# Patient Record
Sex: MALE | Race: BLACK OR AFRICAN AMERICAN | Employment: OTHER | URBAN - METROPOLITAN AREA
[De-identification: names, ages, dates, MRNs, and addresses within clinical notes are randomized per-mention and may not be internally consistent; named-entity substitution may affect disease eponyms.]

---

## 2017-02-08 ENCOUNTER — OFFICE VISIT (OUTPATIENT)
Dept: ENDOCRINOLOGY | Age: 82
End: 2017-02-08

## 2017-02-08 VITALS
HEIGHT: 66 IN | SYSTOLIC BLOOD PRESSURE: 136 MMHG | HEART RATE: 96 BPM | DIASTOLIC BLOOD PRESSURE: 75 MMHG | BODY MASS INDEX: 27.27 KG/M2 | WEIGHT: 169.7 LBS

## 2017-02-08 DIAGNOSIS — E11.9 TYPE 2 DIABETES MELLITUS WITHOUT COMPLICATION, WITHOUT LONG-TERM CURRENT USE OF INSULIN (HCC): Primary | ICD-10-CM

## 2017-02-08 DIAGNOSIS — I10 ESSENTIAL HYPERTENSION WITH GOAL BLOOD PRESSURE LESS THAN 130/80: ICD-10-CM

## 2017-02-08 RX ORDER — METFORMIN HYDROCHLORIDE 500 MG/1
500 TABLET, EXTENDED RELEASE ORAL
Qty: 180 TAB | Refills: 3 | Status: SHIPPED | OUTPATIENT
Start: 2017-02-08 | End: 2017-04-05 | Stop reason: SDUPTHER

## 2017-02-08 RX ORDER — ALBUTEROL SULFATE 90 UG/1
AEROSOL, METERED RESPIRATORY (INHALATION)
COMMUNITY
Start: 2016-12-22

## 2017-02-08 RX ORDER — GLIPIZIDE 5 MG/1
TABLET ORAL 2 TIMES DAILY
COMMUNITY
End: 2017-02-27

## 2017-02-08 RX ORDER — LANCETS
EACH MISCELLANEOUS
Qty: 1 PACKAGE | Refills: 7 | Status: SHIPPED | OUTPATIENT
Start: 2017-02-08

## 2017-02-08 RX ORDER — BLOOD-GLUCOSE METER
KIT MISCELLANEOUS
COMMUNITY
Start: 2017-02-07

## 2017-02-08 NOTE — PATIENT INSTRUCTIONS
Start metformin 500mg twice daily  Hold the glipizide for now, we will restart it later with your lunch    ----------------------------------------------------------------------------------------------------------------------    Below you will find a glucose log sheet which you can use to record your blood sugars. Without checking and recording what your home glucose levels are, it will be difficult to make any changes to your medication dose, even when significant changes may be needed. Please feel free to use the log below to record your home glucose levels. At the very least, I would like for you to login the entire 2-3 weeks just before your visit so we can make your visit much more productive and beneficial to you. GLUCOSE LOG SHEET:    Date Breakfast Lunch Dinner Bedtime Comments ? GLUCOSE LOG SHEET:    Date Breakfast Lunch Dinner Bedtime Comments ? GLUCOSE LOG SHEET:    Date Breakfast Lunch Dinner Bedtime Comments ? Learning About Diabetes Food Guidelines  Your Care Instructions  Meal planning is important to manage diabetes. It helps keep your blood sugar at a target level (which you set with your doctor). You don't have to eat special foods.  You can eat what your family eats, including sweets once in a while. But you do have to pay attention to how often you eat and how much you eat of certain foods. You may want to work with a dietitian or a certified diabetes educator (CDE) to help you plan meals and snacks. A dietitian or CDE can also help you lose weight if that is one of your goals. What should you know about eating carbs? Managing the amount of carbohydrate (carbs) you eat is an important part of healthy meals when you have diabetes. Carbohydrate is found in many foods. · Learn which foods have carbs. And learn the amounts of carbs in different foods. ¨ Bread, cereal, pasta, and rice have about 15 grams of carbs in a serving. A serving is 1 slice of bread (1 ounce), ½ cup of cooked cereal, or 1/3 cup of cooked pasta or rice. ¨ Fruits have 15 grams of carbs in a serving. A serving is 1 small fresh fruit, such as an apple or orange; ½ of a banana; ½ cup of cooked or canned fruit; ½ cup of fruit juice; 1 cup of melon or raspberries; or 2 tablespoons of dried fruit. ¨ Milk and no-sugar-added yogurt have 15 grams of carbs in a serving. A serving is 1 cup of milk or 2/3 cup of no-sugar-added yogurt. ¨ Starchy vegetables have 15 grams of carbs in a serving. A serving is ½ cup of mashed potatoes or sweet potato; 1 cup winter squash; ½ of a small baked potato; ½ cup of cooked beans; or ½ cup cooked corn or green peas. · Learn how much carbs to eat each day and at each meal. A dietitian or CDE can teach you how to keep track of the amount of carbs you eat. This is called carbohydrate counting. · If you are not sure how to count carbohydrate grams, use the Plate Method to plan meals. It is a good, quick way to make sure that you have a balanced meal. It also helps you spread carbs throughout the day. ¨ Divide your plate by types of foods.  Put non-starchy vegetables on half the plate, meat or other protein food on one-quarter of the plate, and a grain or starchy vegetable in the final quarter of the plate. To this you can add a small piece of fruit and 1 cup of milk or yogurt, depending on how many carbs you are supposed to eat at a meal.  · Try to eat about the same amount of carbs at each meal. Do not \"save up\" your daily allowance of carbs to eat at one meal.  · Proteins have very little or no carbs per serving. Examples of proteins are beef, chicken, turkey, fish, eggs, tofu, cheese, cottage cheese, and peanut butter. A serving size of meat is 3 ounces, which is about the size of a deck of cards. Examples of meat substitute serving sizes (equal to 1 ounce of meat) are 1/4 cup of cottage cheese, 1 egg, 1 tablespoon of peanut butter, and ½ cup of tofu. How can you eat out and still eat healthy? · Learn to estimate the serving sizes of foods that have carbohydrate. If you measure food at home, it will be easier to estimate the amount in a serving of restaurant food. · If the meal you order has too much carbohydrate (such as potatoes, corn, or baked beans), ask to have a low-carbohydrate food instead. Ask for a salad or green vegetables. · If you use insulin, check your blood sugar before and after eating out to help you plan how much to eat in the future. · If you eat more carbohydrate at a meal than you had planned, take a walk or do other exercise. This will help lower your blood sugar. What else should you know? · Limit saturated fat, such as the fat from meat and dairy products. This is a healthy choice because people who have diabetes are at higher risk of heart disease. So choose lean cuts of meat and nonfat or low-fat dairy products. Use olive or canola oil instead of butter or shortening when cooking. · Don't skip meals. Your blood sugar may drop too low if you skip meals and take insulin or certain medicines for diabetes. · Check with your doctor before you drink alcohol. Alcohol can cause your blood sugar to drop too low.  Alcohol can also cause a bad reaction if you take certain diabetes medicines. Follow-up care is a key part of your treatment and safety. Be sure to make and go to all appointments, and call your doctor if you are having problems. It's also a good idea to know your test results and keep a list of the medicines you take. Where can you learn more? Go to http://ericka-wilbur.info/. Enter L549 in the search box to learn more about \"Learning About Diabetes Food Guidelines. \"  Current as of: May 23, 2016  Content Version: 11.1  © 9908-2159 [employer]. Care instructions adapted under license by [employer] (which disclaims liability or warranty for this information). If you have questions about a medical condition or this instruction, always ask your healthcare professional. [address] any warranty or liability for your use of this information.

## 2017-02-08 NOTE — MR AVS SNAPSHOT
Visit Information Date & Time Provider Department Dept. Phone Encounter #  
 2/8/2017  8:30 AM Armaan Yates, [address] [place] [address] Diabetes and Endocrinology [phone] [id number] Follow-up Instructions Return in about 3 months (around 5/8/2017). Your Appointments 2/8/2017 10:45 AM  
Any with Yenifer Valencia MD  
[facility] OFFICE-[facility] ([address]) Appt Note: 6m fu; r/s; r/s  
 [address] [facility] [id number] [phone] [facility] [address] [phone] Upcoming Health Maintenance Date Due INFLUENZA AGE 9 TO ADULT 8/1/2016 Pneumococcal 65+ Low/Medium Risk (2 of 2 - PPSV23) 7/13/2017 MEDICARE YEARLY EXAM 7/14/2017 GLAUCOMA SCREENING Q2Y 4/14/2018 DTaP/Tdap/Td series (2 - Td) 7/13/2026 Allergies as of 2/8/2017  Review Complete On: 2/8/2017 By: Armaan Yates MD  
 No Known Allergies Current Immunizations  Never Reviewed Name Date Pneumococcal Conjugate (PCV-13) 7/13/2016 Not reviewed this visit You Were Diagnosed With   
  
 Codes Comments Type 2 diabetes mellitus without complication, without long-term current use of insulin (HCC)    -  Primary ICD-10-CM: E11.9 ICD-9-CM: 250.00 Essential hypertension with goal blood pressure less than 130/80     ICD-10-CM: I10 
ICD-9-CM: 401.9 Vitals BP Pulse Height(growth percentile) Weight(growth percentile) BMI Smoking Status 136/75 (BP 1 Location: Right arm, BP Patient Position: Sitting) 96 5' 6\" (1.676 m) 169 lb 11.2 oz (77 kg) 27.39 kg/m2 Former Smoker Vitals History BMI and BSA Data Body Mass Index Body Surface Area  
 27.39 kg/m 2 1.89 m 2 Preferred Pharmacy Pharmacy Name Phone [id number], [address] [facility] [phone] Your Updated Medication List  
  
   
 This list is accurate as of: 2/8/17  9:12 AM.  Always use your most recent med list.  
  
  
  
  
 aspirin, buffered 81 mg Tab Take  by mouth. fluticasone 220 mcg/actuation inhaler Commonly known as:  FLOVENT HFA Take  by inhalation. glipiZIDE 5 mg tablet Commonly known as:  Myrla Nordmann Take  by mouth two (2) times a day. multivitamin tablet Commonly known as:  ONE A DAY Take 1 Tab by mouth daily. omega-3 fatty acids-vitamin e 1,000 mg Cap Take 1 Cap by mouth. ONETOUCH ULTRAMINI monitoring kit Generic drug:  Blood-Glucose Meter PROAIR HFA 90 mcg/actuation inhaler Generic drug:  albuterol  
  
 quinapril-hydroCHLOROthiazide 20-12.5 mg per tablet Commonly known as:  ACCURETIC  
TAKE 1 TABLET TWICE A DAY  
  
 XALATAN 0.005 % ophthalmic solution Generic drug:  latanoprost  
Administer 1 Drop to both eyes nightly. We Performed the Following HEMOGLOBIN A1C WITH EAG [11802 CPT(R)]  DIABETES FOOT EXAM [HM7 Custom] LIPID PANEL [72814 CPT(R)] METABOLIC PANEL, COMPREHENSIVE [26915 CPT(R)] MICROALBUMIN, UR, RAND W/ MICROALBUMIN/CREA RATIO [id number] CPT(R)] Follow-up Instructions Return in about 3 months (around 5/8/2017). Patient Instructions Start metformin 500mg twice daily Hold the glipizide for now, we will restart it later with your lunch 
 
---------------------------------------------------------------------------------------------------------------------- Below you will find a glucose log sheet which you can use to record your blood sugars. Without checking and recording what your home glucose levels are, it will be difficult to make any changes to your medication dose, even when significant changes may be needed. Please feel free to use the log below to record your home glucose levels.  At the very least, I would like for you to login the entire 2-3 weeks just before your visit so we can make your visit much more productive and beneficial to you. GLUCOSE LOG SHEET: 
 
Date Breakfast Lunch Dinner Bedtime Comments ? GLUCOSE LOG SHEET: 
 
Date Breakfast Lunch Dinner Bedtime Comments ? GLUCOSE LOG SHEET: 
 
Date Breakfast Lunch Dinner Bedtime Comments ? Learning About Diabetes Food Guidelines Your Care Instructions Meal planning is important to manage diabetes. It helps keep your blood sugar at a target level (which you set with your doctor). You don't have to eat special foods. You can eat what your family eats, including sweets once in a while. But you do have to pay attention to how often you eat and how much you eat of certain foods. You may want to work with a dietitian or a certified diabetes educator (CDE) to help you plan meals and snacks. A dietitian or CDE can also help you lose weight if that is one of your goals. What should you know about eating carbs? Managing the amount of carbohydrate (carbs) you eat is an important part of healthy meals when you have diabetes. Carbohydrate is found in many foods. · Learn which foods have carbs. And learn the amounts of carbs in different foods.  
¨ Bread, cereal, pasta, and rice have about 15 grams of carbs in a serving. A serving is 1 slice of bread (1 ounce), ½ cup of cooked cereal, or 1/3 cup of cooked pasta or rice. ¨ Fruits have 15 grams of carbs in a serving. A serving is 1 small fresh fruit, such as an apple or orange; ½ of a banana; ½ cup of cooked or canned fruit; ½ cup of fruit juice; 1 cup of melon or raspberries; or 2 tablespoons of dried fruit. ¨ Milk and no-sugar-added yogurt have 15 grams of carbs in a serving. A serving is 1 cup of milk or 2/3 cup of no-sugar-added yogurt. ¨ Starchy vegetables have 15 grams of carbs in a serving. A serving is ½ cup of mashed potatoes or sweet potato; 1 cup winter squash; ½ of a small baked potato; ½ cup of cooked beans; or ½ cup cooked corn or green peas. · Learn how much carbs to eat each day and at each meal. A dietitian or CDE can teach you how to keep track of the amount of carbs you eat. This is called carbohydrate counting. · If you are not sure how to count carbohydrate grams, use the Plate Method to plan meals. It is a good, quick way to make sure that you have a balanced meal. It also helps you spread carbs throughout the day. ¨ Divide your plate by types of foods. Put non-starchy vegetables on half the plate, meat or other protein food on one-quarter of the plate, and a grain or starchy vegetable in the final quarter of the plate. To this you can add a small piece of fruit and 1 cup of milk or yogurt, depending on how many carbs you are supposed to eat at a meal. 
· Try to eat about the same amount of carbs at each meal. Do not \"save up\" your daily allowance of carbs to eat at one meal. 
· Proteins have very little or no carbs per serving. Examples of proteins are beef, chicken, turkey, fish, eggs, tofu, cheese, cottage cheese, and peanut butter. A serving size of meat is 3 ounces, which is about the size of a deck of cards.  Examples of meat substitute serving sizes (equal to 1 ounce of meat) are 1/4 cup of cottage cheese, 1 egg, 1 tablespoon of peanut butter, and ½ cup of tofu. How can you eat out and still eat healthy? · Learn to estimate the serving sizes of foods that have carbohydrate. If you measure food at home, it will be easier to estimate the amount in a serving of restaurant food. · If the meal you order has too much carbohydrate (such as potatoes, corn, or baked beans), ask to have a low-carbohydrate food instead. Ask for a salad or green vegetables. · If you use insulin, check your blood sugar before and after eating out to help you plan how much to eat in the future. · If you eat more carbohydrate at a meal than you had planned, take a walk or do other exercise. This will help lower your blood sugar. What else should you know? · Limit saturated fat, such as the fat from meat and dairy products. This is a healthy choice because people who have diabetes are at higher risk of heart disease. So choose lean cuts of meat and nonfat or low-fat dairy products. Use olive or canola oil instead of butter or shortening when cooking. · Don't skip meals. Your blood sugar may drop too low if you skip meals and take insulin or certain medicines for diabetes. · Check with your doctor before you drink alcohol. Alcohol can cause your blood sugar to drop too low. Alcohol can also cause a bad reaction if you take certain diabetes medicines. Follow-up care is a key part of your treatment and safety. Be sure to make and go to all appointments, and call your doctor if you are having problems. It's also a good idea to know your test results and keep a list of the medicines you take. Where can you learn more? Go to http://ericka-wilbur.info/. Enter E798 in the search box to learn more about \"Learning About Diabetes Food Guidelines. \" Current as of: May 23, 2016 Content Version: 11.1 © 0939-1195 [employer].  Care instructions adapted under license by [employer] (which disclaims liability or warranty for this information). If you have questions about a medical condition or this instruction, always ask your healthcare professional. [address] any warranty or liability for your use of this information. Introducing [facility]! Henrique Duron introduces Craneware patient portal. Now you can access parts of your medical record, email your doctor's office, and request medication refills online. 1. In your internet browser, go to [email or web address] 2. Click on the First Time User? Click Here link in the Sign In box. You will see the New Member Sign Up page. 3. Enter your Craneware Access Code exactly as it appears below. You will not need to use this code after youve completed the sign-up process. If you do not sign up before the expiration date, you must request a new code. · Craneware Access Code: [email or web address] Expires: 5/9/2017  9:11 AM 
 
4. Enter the last four digits of your Social Security Number (xxxx) and Date of Birth (mm/dd/yyyy) as indicated and click Submit. You will be taken to the next sign-up page. 5. Create a Craneware ID. This will be your Craneware login ID and cannot be changed, so think of one that is secure and easy to remember. 6. Create a Craneware password. You can change your password at any time. 7. Enter your Password Reset Question and Answer. This can be used at a later time if you forget your password. 8. Enter your e-mail address. You will receive e-mail notification when new information is available in [address] 9. Click Sign Up. You can now view and download portions of your medical record. 10. Click the Download Summary menu link to download a portable copy of your medical information. If you have questions, please visit the Frequently Asked Questions section of the Craneware website. Remember, Craneware is NOT to be used for urgent needs. For medical emergencies, dial 911. Now available from your iPhone and Android! Please provide this summary of care documentation to your next provider. Your primary care clinician is listed as Leslie Hayden. If you have any questions after today's visit, please call [phone].

## 2017-02-08 NOTE — PROGRESS NOTES
CONSULTATION REQUESTED BY: Shelia Latif MD     REASON FOR CONSULT:  Uncontrolled type 2 diabetes    CHIEF COMPLAINT: Blood glucose is high    HISTORY OF PRESENT ILLNESS:   Mamadou Sumner is a 80 y.o. male with a PMHx as noted below who was referred to our endocrinology clinic for evaluation of uncontrolled type 2 diabetes. Diabetes History:  Diabetes was diagnosed just a few days ago with an A1c of 8.2%. Describes driving around at noon on Thursday, woke in a parking lot at the [facility] in his car, not certain how he got there. He was evaluated and found not to have a stroke, but rather had a UTI, and underlying diabetes. Family History of diabetes is negative. Last A1c prior to initial visit was 8.2% on 2/3/17  Regimen at time of establishing care includes  - Glipizide 5mg with breakfast was prescribed    Review of home glucose:  New diagnosis, just got a new glucometer    Patient notes that he was not aware he had diabetes. He also has high blood pressure on quinapril-HCTZ. Not on a statin, cholesterol status uncertain. Denies neuropathy. PAST MEDICAL/SURGICAL HISTORY:   Past Medical History   Diagnosis Date    Asthma     BPH (benign prostatic hyperplasia)     Hypertension     Other ill-defined conditions(799.89)      Prostate problems     Past Surgical History   Procedure Laterality Date    Irrigate catheter  4/24/2010          Hx prostatectomy      Hx colonoscopy      Hx endoscopy         ALLERGIES:   No Known Allergies    MEDICATIONS ON ADMISSION:     Current Outpatient Prescriptions:     PROAIR HFA 90 mcg/actuation inhaler, , Disp: , Rfl:     ONETOUCH ULTRAMINI monitoring kit, , Disp: , Rfl:     fluticasone (FLOVENT HFA) 220 mcg/actuation inhaler, Take  by inhalation. , Disp: , Rfl:     quinapril-hydrochlorothiazide (ACCURETIC) 20-12.5 mg per tablet, TAKE 1 TABLET TWICE A DAY, Disp: 180 Tab, Rfl: 3    omega-3 fatty acids-vitamin e 1,000 mg cap, Take 1 Cap by mouth., Disp: , Rfl:     Aspirin, Buffered 81 mg tab, Take  by mouth., Disp: , Rfl:     multivitamin (ONE A DAY) tablet, Take 1 Tab by mouth daily. , Disp: , Rfl:     latanoprost (XALATAN) 0.005 % ophthalmic solution, Administer 1 Drop to both eyes nightly., Disp: , Rfl:     SOCIAL HISTORY:   Social History     Social History    Marital status:      Spouse name: N/A    Number of children: N/A    Years of education: N/A     Occupational History    Not on file. Social History Main Topics    Smoking status: Former Smoker     Quit date: 7/10/1955    Smokeless tobacco: Never Used    Alcohol use No    Drug use: No    Sexual activity: Yes     Partners: Female     Birth control/ protection: None     Other Topics Concern    Not on file     Social History Narrative       FAMILY HISTORY:  Family History   Problem Relation Age of Onset   Leobardo Jameson Cancer Mother     Hypertension Mother     No Known Problems Father     Hypertension Sister     Arthritis-osteo Neg Hx        REVIEW OF SYSTEMS: Complete ROS assessed and noted for that which is described above, all else are negative.   Eyes: normal  ENT: normal  CVS: normal  Resp: normal  GI: normal  : normal  GYN: normal  Endocrine: normal  Integument: normal  Musculoskeletal: normal  Neuro: normal  Psych: normal      PHYSICAL EXAMINATION:    VITAL SIGNS:  Visit Vitals    /75 (BP 1 Location: Right arm, BP Patient Position: Sitting)    Pulse 96    Ht 5' 6\" (1.676 m)    Wt 169 lb 11.2 oz (77 kg)    BMI 27.39 kg/m2       GENERAL: NCAT, Sitting comfortably, NAD  EYES: EOMI, non-icteric, no proptosis  Ear/Nose/Throat: NCAT, no inflammation, no masses  LYMPH NODES: No LAD  CARDIOVASCULAR: S1 S2, RRR, No murmur, 2+ radial pulses  RESPIRATORY: CTA b/l, no wheeze/rales  GASTROINTESTINAL:  NT, ND  MUSCULOSKELETAL: Normal ROM, no atrophy  SKIN: warm, no edema/rash/ or other skin changes  NEUROLOGIC: 5/5 power all extremities, no tremor, AAOx3  PSYCHIATRIC: Normal affect, Normal insight and judgement         Diabetic foot exam performed by Marybel Meade MD     Measurement  Response Nurse Comment Physician Comment   Monofilament  R - normal sensation with micro filament  L - normal sensation with micro filament     Pulse DP R - 2+ (normal)  L - 2+ (normal)     Vibration R - Normal    L - Normal     Structural deformity R - None  L - None     Skin Integrity / Deformity R - None  L - None        Reviewed by:             REVIEW OF LABORATORY AND RADIOLOGY DATA:   Labs and documentation have been reviewed as described above. ASSESSMENT AND PLAN:   Damon Mcwilliams is a 80 y.o. male with a PMHx as noted above who was referred to our endocrinology clinic for evaluation of uncontrolled type 2 diabetes. Problems:  Type 2 diabetes Uncontrolled  Hypertension    We had the pleasure of reviewing together the basics of diabetes including basic pathophysiology and diabetes care. We further discussed the importance of checking home glucose regularly and taking all of their scheduled medications in order to have the best possible outcome. I was able to answer any questions they had in clinic today and they are invited to reach me if they have any further questions. Based upon our discussion together today we have decided to make the following changes:    New onset diabetes. Advising to switch his glipizide to the largest meal, reportedly lunch, and to start metformin. Baseline labs and establishing care today. Diabetes foot exam performed. PLAN  Type 2 Diabetes  Medications:  Start metformin 500mg BID  Move Glipizide 5mg to lunch, largest meal of the day  Advised to check glucose qAM   Provided with glucose log sheets for later review. Using one touch ultra mini, will need refills    Labs: urine microalbumin, CMP, HbA1c, and FLP  Feet: Examination performed today. Encouraged regular \"self-checks\" at home.   Eyes: Advised updated eye exam report faxed to our office for review. Kidney: GFR/Urine microalbumin as discussed. HTN: BP stable on current medications, quinapril-HCTZ  Lipids Fasting lipids to be obtained/reviewed. RTC: I would like to see them back in 3 months. Lisa President.  [address] [facility] Diabetes & Endocrinology

## 2017-02-09 LAB
ALBUMIN SERPL-MCNC: 4.1 G/DL (ref 3.5–4.7)
ALBUMIN/CREAT UR: 30.9 MG/G CREAT (ref 0–30)
ALBUMIN/GLOB SERPL: 1.3 {RATIO} (ref 1.1–2.5)
ALP SERPL-CCNC: 54 IU/L (ref 39–117)
ALT SERPL-CCNC: 39 IU/L (ref 0–44)
AST SERPL-CCNC: 48 IU/L (ref 0–40)
BILIRUB SERPL-MCNC: 0.9 MG/DL (ref 0–1.2)
BUN SERPL-MCNC: 10 MG/DL (ref 8–27)
BUN/CREAT SERPL: 11 (ref 10–22)
CALCIUM SERPL-MCNC: 9.4 MG/DL (ref 8.6–10.2)
CHLORIDE SERPL-SCNC: 94 MMOL/L (ref 96–106)
CHOLEST SERPL-MCNC: 167 MG/DL (ref 100–199)
CO2 SERPL-SCNC: 26 MMOL/L (ref 18–29)
CREAT SERPL-MCNC: 0.92 MG/DL (ref 0.76–1.27)
CREAT UR-MCNC: 77.4 MG/DL
EST. AVERAGE GLUCOSE BLD GHB EST-MCNC: 174 MG/DL
GLOBULIN SER CALC-MCNC: 3.1 G/DL (ref 1.5–4.5)
GLUCOSE SERPL-MCNC: 104 MG/DL (ref 65–99)
HBA1C MFR BLD: 7.7 % (ref 4.8–5.6)
HDLC SERPL-MCNC: 48 MG/DL
INTERPRETATION, 910389: NORMAL
LDLC SERPL CALC-MCNC: 96 MG/DL (ref 0–99)
Lab: NORMAL
MICROALBUMIN UR-MCNC: 23.9 UG/ML
POTASSIUM SERPL-SCNC: 4.9 MMOL/L (ref 3.5–5.2)
PROT SERPL-MCNC: 7.2 G/DL (ref 6–8.5)
SODIUM SERPL-SCNC: 139 MMOL/L (ref 134–144)
TRIGL SERPL-MCNC: 117 MG/DL (ref 0–149)
VLDLC SERPL CALC-MCNC: 23 MG/DL (ref 5–40)

## 2017-02-09 NOTE — PROGRESS NOTES
Lab results reviewed,   Letter sent to patient with results and interpretation. Ismael Salmeron.  [address] [facility] Endocrinology  [address] [place] [address]

## 2017-02-10 ENCOUNTER — TELEPHONE (OUTPATIENT)
Dept: ENDOCRINOLOGY | Age: 82
End: 2017-02-10

## 2017-02-10 NOTE — TELEPHONE ENCOUNTER
----- Message from Ashley Cai sent at 2/10/2017  9:09 AM EST -----  Regarding: FW: Dustin/telephone  See attached message from [facility]. \"  Thanks Karina Soto.      ----- Message -----     From: Joellen Colon     Sent: 2/10/2017   8:43 AM       To: [address] [id number] Office Pool  Subject: Dustin/telephone                                Pt stated his blood sugar is 219. He is taking  Medication for his cough and wondering if that is the reason his BS is up. Pt number is [phone].    -------------------------    Addendum: 2/10/2017, 10:35 AM    Spoke with Alphonso Warner by phone and he wanted Dr. Aubrey Molina to know that he is on day one of a Prednisone regime for an asthma attack and wants to know, what if anything he should be doing about his blood sugar being high during this time. He takes 10mg tabs of Prednisone as follows:    Start: 4 tabs QD X's 3 days            3 tabs QD X's 3 days            2 tabs QD X's 3 days            1 tab   QD X's 3 days           1/2 tab QD X's 4 days    He took 4 tabs last night and his blood sugar was 219 this morning. Reza Lomeli       . ..................................... Noted,    Steroids like prednisone will certainly raise his blood sugar quite high. If he has had prednisone previously in the past month or two, that may also be why his HbA1c was elevated. Steroids in a diabetic patient are a very big problem, but sometimes it is necessary to treat lung conditions including asthma. In response to this, he should start taking his glipizide, he can start with taking 5mg with breakfast and dinner, and he can increase this to 10mg with breakfast and dinner as needed to keep his blood sugar below 150. As he tapers himself off the prednisone, he will need to reduce the glipizide again down to 5mg with breakfast and dinner, and if his blood sugars are generally below 120, he could try to stop the glipizide at that point an only use the metformin to see how he does. Thanks,    Prem Burkett. [facility]  [employer]    --------------------------------    Addendum: 2/10/2017, 2:11 PM    Spoke with Jordan Minors by phone, and relayed the above message. He understood the information and wrote it down and read it back to me. He will do as instructed and call if any further problems.       Mariana Bowers

## 2017-02-22 ENCOUNTER — TELEPHONE (OUTPATIENT)
Dept: ENDOCRINOLOGY | Age: 82
End: 2017-02-22

## 2017-02-22 NOTE — TELEPHONE ENCOUNTER
----- Message from Skye Castro sent at 2/22/2017 12:18 PM EST -----  Regarding: FW: Dr. Maryann Tang  Please see message below. Thank you. Burt Bran   ----- Message -----     From: Shandra Vaughn     Sent: 2/22/2017  11:28 AM       To: Rema Kuhn [facility]  Subject: Dr. Maryann Tang                            Pt stated he was taking \"Glipizide\" 5mg because he is was on a steroid. He stated that he is now off of the steroid and would like to know if he should stop taking the \"Glipizide\". He stated that he was taking \"Metphormin\"  before and wanted to know if the doctor thinks he should now go back on that medication. He would like a call back from the practice. His best contact number is [phone].      ---------------------------    Addendum: 2/22/2017, 1:49 PM    Spoke with Alphonso Warner by phone. He states he has been on a steroid regime for a asthma flare up but is done with that now. He was taking some Glipizide while on the steroid and is wondering if he should stay on that or go back on his Metformin. I told him I would talk with Dr. Aubrey Molina and get back to him. Reza Lomeli      -------------------  Addendum: 2/22/2017, 3:52 PM    Only if his blood sugar in the AM and at bedtime are below 150, then he can stop taking the glipizide and see if his blood sugars will stay stable. Otherwise if his blood sugars remain >150, I would recommend continuing it, and if not improving over the next 2 weeks, he should send me a log of his blood sugars to review since we may need to make further changes. Thanks  Saeed Thorne. [address]    ----------------------------------    Addendum: 2/22/2017, 4:11 PM    Spoke with Alphonso Warner by phone and relayed the above message. He said his blood sugars have been below 150 for the past nine days.  He is going to stop the Glipizide and keep track of how things age going and will send us a log in a couple of weeks.       Estil Legacy

## 2017-02-27 ENCOUNTER — OFFICE VISIT (OUTPATIENT)
Dept: FAMILY MEDICINE CLINIC | Age: 82
End: 2017-02-27

## 2017-02-27 VITALS
DIASTOLIC BLOOD PRESSURE: 78 MMHG | SYSTOLIC BLOOD PRESSURE: 139 MMHG | OXYGEN SATURATION: 98 % | RESPIRATION RATE: 18 BRPM | TEMPERATURE: 97.2 F | BODY MASS INDEX: 27.61 KG/M2 | WEIGHT: 171.8 LBS | HEIGHT: 66 IN | HEART RATE: 89 BPM

## 2017-02-27 DIAGNOSIS — N40.0 BENIGN PROSTATIC HYPERPLASIA, PRESENCE OF LOWER URINARY TRACT SYMPTOMS UNSPECIFIED, UNSPECIFIED MORPHOLOGY: ICD-10-CM

## 2017-02-27 DIAGNOSIS — R73.09 ELEVATED GLUCOSE: ICD-10-CM

## 2017-02-27 DIAGNOSIS — J98.09 RECURRENT BRONCHOSPASM: ICD-10-CM

## 2017-02-27 DIAGNOSIS — I10 ESSENTIAL HYPERTENSION: Primary | ICD-10-CM

## 2017-02-27 RX ORDER — BENZONATATE 200 MG/1
CAPSULE ORAL
Refills: 0 | COMMUNITY
Start: 2017-01-08 | End: 2017-02-27

## 2017-02-27 RX ORDER — HYDROCODONE POLISTIREX AND CHLORPHENIRAMINE POLISTIREX 10; 8 MG/5ML; MG/5ML
SUSPENSION, EXTENDED RELEASE ORAL
Refills: 0 | COMMUNITY
Start: 2017-01-10 | End: 2017-02-27

## 2017-02-27 RX ORDER — QUINAPRIL HCL AND HYDROCHLOROTHIAZIDE 20; 12.5 MG/1; MG/1
TABLET ORAL
Qty: 180 TAB | Refills: 3 | Status: CANCELLED | OUTPATIENT
Start: 2017-02-27

## 2017-02-27 RX ORDER — PROMETHAZINE HYDROCHLORIDE 6.25 MG/5ML
SYRUP ORAL
Refills: 0 | COMMUNITY
Start: 2017-02-02 | End: 2017-02-27

## 2017-02-27 RX ORDER — CODEINE PHOSPHATE AND GUAIFENESIN 10; 100 MG/5ML; MG/5ML
SOLUTION ORAL
COMMUNITY
Start: 2016-12-22 | End: 2017-02-27

## 2017-02-27 RX ORDER — LANCETS 33 GAUGE
EACH MISCELLANEOUS
Refills: 7 | COMMUNITY
Start: 2017-02-08

## 2017-02-27 RX ORDER — PREDNISONE 10 MG/1
TABLET ORAL
Refills: 0 | COMMUNITY
Start: 2017-02-02 | End: 2017-02-27 | Stop reason: ALTCHOICE

## 2017-02-27 RX ORDER — METHYLPREDNISOLONE 4 MG/1
TABLET ORAL
COMMUNITY
Start: 2016-12-25 | End: 2017-02-27 | Stop reason: ALTCHOICE

## 2017-02-27 RX ORDER — ALBUTEROL SULFATE 0.83 MG/ML
SOLUTION RESPIRATORY (INHALATION)
Refills: 1 | COMMUNITY
Start: 2017-02-09

## 2017-02-27 RX ORDER — CEFUROXIME AXETIL 250 MG/1
TABLET ORAL
COMMUNITY
Start: 2016-12-22 | End: 2017-02-27 | Stop reason: ALTCHOICE

## 2017-02-27 RX ORDER — LEVOFLOXACIN 500 MG/1
TABLET, FILM COATED ORAL
COMMUNITY
Start: 2017-01-10 | End: 2017-02-27 | Stop reason: ALTCHOICE

## 2017-02-27 NOTE — PROGRESS NOTES
Chief Complaint   Patient presents with    Hypertension     6m f/u    Diabetes     6m f/u     Endo Dr. Olga Islas 2/2017. Pt went to [facility] 2/4/2017, Pt found in his car and transported to hospital.  MRI, CT, Echocardiogram.  Pt dx diabeties, UTI.

## 2017-02-27 NOTE — MR AVS SNAPSHOT
Visit Information Date & Time Provider Department Dept. Phone Encounter #  
 2/27/2017  2:45 PM Elena Rocha MD [facility] [phone] [id number] Follow-up Instructions Return in about 6 months (around 8/27/2017). Your Appointments 5/10/2017 10:50 AM  
Follow Up with Pankaj Harrell MD  
[facility] [address] [place] [address]) Appt Note: f/u            dm           3 month  
 [address] [facility] [phone] [facility] Upcoming Health Maintenance Date Due Pneumococcal 65+ Low/Medium Risk (2 of 2 - PPSV23) 7/13/2017 MEDICARE YEARLY EXAM 7/14/2017 GLAUCOMA SCREENING Q2Y 4/14/2018 DTaP/Tdap/Td series (2 - Td) 7/13/2026 Allergies as of 2/27/2017  Review Complete On: 2/8/2017 By: Pankaj Harrell MD  
 No Known Allergies Current Immunizations  Never Reviewed Name Date Pneumococcal Conjugate (PCV-13) 7/13/2016 Not reviewed this visit You Were Diagnosed With   
  
 Codes Comments Essential hypertension    -  Primary ICD-10-CM: I10 
ICD-9-CM: 401.9 Elevated glucose     ICD-10-CM: R73.09 
ICD-9-CM: 790.29 Recurrent bronchospasm     ICD-10-CM: J98.09 
ICD-9-CM: 519.19 Benign prostatic hyperplasia, presence of lower urinary tract symptoms unspecified, unspecified morphology     ICD-10-CM: N40.0 ICD-9-CM: 600.00 Vitals BP  
  
  
  
  
  
 139/78 (BP 1 Location: Left arm, BP Patient Position: Sitting) Vitals History BMI and BSA Data Body Mass Index Body Surface Area  
 27.73 kg/m 2 1.9 m 2 Preferred Pharmacy Pharmacy Name Phone [address] [facility] [phone] Your Updated Medication List  
  
   
This list is accurate as of: 2/27/17  2:45 PM.  Always use your most recent med list.  
  
  
  
  
 aspirin, buffered 81 mg Tab Take  by mouth. fluticasone 220 mcg/actuation inhaler Commonly known as:  FLOVENT HFA Take  by inhalation. glucose blood VI test strips strip Commonly known as:  PHARMACIST CHOICE One Touch Ultra Strips; 1Check glucose 1-2 times daily, Diagnosis E11.9 * Lancets Misc For use with One Touch Mini Glucometer, Use 1-2 times daily with glucose checks, Diagnosis E11.9 * One Touch Delica 33 gauge Misc Generic drug:  lancets USE 1-2 TIMES DAILY WITH GLUCOSE CHECKS, DIAGNOSIS E11.9  
  
 metFORMIN  mg tablet Commonly known as:  GLUCOPHAGE XR Take 1 Tab by mouth Before breakfast and dinner. multivitamin tablet Commonly known as:  ONE A DAY Take 1 Tab by mouth daily. omega-3 fatty acids-vitamin e 1,000 mg Cap Take 1 Cap by mouth. ONETOUCH ULTRAMINI monitoring kit Generic drug:  Blood-Glucose Meter * PROAIR HFA 90 mcg/actuation inhaler Generic drug:  albuterol * albuterol 2.5 mg /3 mL (0.083 %) nebulizer solution Commonly known as:  PROVENTIL VENTOLIN  
USE THE CONTENTS OF 1 VIAL VIA NEBULIZER EVERY 4 HOURS AS NEEDED FOR BREATHING PROBLEMS  
  
 quinapril-hydroCHLOROthiazide 20-12.5 mg per tablet Commonly known as:  ACCURETIC  
TAKE 1 TABLET TWICE A DAY  
  
 XALATAN 0.005 % ophthalmic solution Generic drug:  latanoprost  
Administer 1 Drop to both eyes nightly. * Notice: This list has 4 medication(s) that are the same as other medications prescribed for you. Read the directions carefully, and ask your doctor or other care provider to review them with you. Follow-up Instructions Return in about 6 months (around 8/27/2017). Introducing [address]! Zohra Longoria introduces [employer] patient portal. Now you can access parts of your medical record, email your doctor's office, and request medication refills online.    
 
1. In your internet browser, go to [email or web address] 2. Click on the First Time User? Click Here link in the Sign In box. You will see the New Member Sign Up page. 3. Enter your Te[employer] Access Code exactly as it appears below. You will not need to use this code after youve completed the sign-up process. If you do not sign up before the expiration date, you must request a new code. · [employer] Access Code: [email or web address] Expires: 5/9/2017  9:11 AM 
 
4. Enter the last four digits of your Social Security Number (xxxx) and Date of Birth (mm/dd/yyyy) as indicated and click Submit. You will be taken to the next sign-up page. 5. Create a RENTISHt ID. This will be your Teach.com login ID and cannot be changed, so think of one that is secure and easy to remember. 6. Create a Te[employer] password. You can change your password at any time. 7. Enter your Password Reset Question and Answer. This can be used at a later time if you forget your password. 8. Enter your e-mail address. You will receive e-mail notification when new information is available in [address] 9. Click Sign Up. You can now view and download portions of your medical record. 10. Click the Download Summary menu link to download a portable copy of your medical information. If you have questions, please visit the Frequently Asked Questions section of the [email or web address] website. Remember, Teach.com is NOT to be used for urgent needs. For medical emergencies, dial 911. Now available from your iPhone and Android! Please provide this summary of care documentation to your next provider. Your primary care clinician is listed as Benji Moya. If you have any questions after today's visit, please call [phone].

## 2017-02-27 NOTE — PROGRESS NOTES
HISTORY OF PRESENT ILLNESS  Barrie Woodard is a 80 y.o. male here today after syncopal episode and admission to [facility] EM[facility] found to have UTI and elevated glucose. Also had asthma exacerbation requiring pred taper and this contributed to elevated glucose. He was eval after discharge by endocrinology, Dr. Perez Rice, and is now taking Metformin ER 500mg bid. HGM  no lows and he feels well. He just had labs with Dr. Perez Rice earlier this month and I reveiwed those, A1c 7.7% and renal function looks good. Asthma back to baseline. Hypertension    The history is provided by the patient. This is a chronic problem. The current episode started more than 1 week ago. The problem has not changed since onset. Pertinent negatives include no chest pain, no dizziness and no shortness of breath. Diabetes   The history is provided by the patient. This is a chronic problem. The current episode started more than 1 week ago. Pertinent negatives include no chest pain, no abdominal pain and no shortness of breath. Review of Systems   Respiratory: Negative for shortness of breath. Cardiovascular: Negative for chest pain. Gastrointestinal: Negative for abdominal pain. Neurological: Negative for dizziness. Physical Exam   Constitutional: He is oriented to person, place, and time. He appears well-developed and well-nourished. /78 (BP 1 Location: Left arm, BP Patient Position: Sitting)  Pulse 89  Temp 97.2 °F (36.2 °C) (Oral)   Resp 18  Ht 5' 6\" (1.676 m)  Wt 171 lb 12.8 oz (77.9 kg)  SpO2 98%  BMI 27.73 kg/m2     HENT:   Head: Normocephalic and atraumatic. Eyes: No scleral icterus. Neck: No thyromegaly present. Cardiovascular: Normal heart sounds. Pulmonary/Chest: Breath sounds normal.   Abdominal: Soft. There is no tenderness. Musculoskeletal: He exhibits no edema. Lymphadenopathy:     He has no cervical adenopathy. Neurological: He is alert and oriented to person, place, and time. Psychiatric: He has a normal mood and affect. Nursing note and vitals reviewed. Patient Active Problem List   Diagnosis Code    Essential hypertension I10    Recurrent bronchospasm J98.09    BPH (benign prostatic hyperplasia) N40.0    Glaucoma H40.9    History of colonoscopy Z98.890    History of cardiac catheterization Z98.890     Past Medical History:   Diagnosis Date    Asthma     BPH (benign prostatic hyperplasia)     Diabetes ([facility] [address]     Hypertension     Other ill-defined conditions(799.89)     Prostate problems     Social History     Social History    Marital status:      Spouse name: N/A    Number of children: N/A    Years of education: N/A     Social History Main Topics    Smoking status: Former Smoker     Quit date: 7/10/1955    Smokeless tobacco: Never Used    Alcohol use No    Drug use: No    Sexual activity: Yes     Partners: Female     Birth control/ protection: None     Other Topics Concern    None     Social History Narrative     Family History   Problem Relation Age of Onset    Cancer Mother     Hypertension Mother     No Known Problems Father     Hypertension Sister     Arthritis-osteo Neg Hx      Current Outpatient Prescriptions   Medication Sig    albuterol (PROVENTIL VENTOLIN) 2.5 mg /3 mL (0.083 %) nebulizer solution USE THE CONTENTS OF 1 VIAL VIA NEBULIZER EVERY 4 HOURS AS NEEDED FOR BREATHING PROBLEMS    ONE TOUCH DELICA 33 gauge misc USE 1-2 TIMES DAILY WITH GLUCOSE CHECKS, DIAGNOSIS E11.9    ONETOUCH ULTRAMINI monitoring kit     metFORMIN ER (GLUCOPHAGE XR) 500 mg tablet Take 1 Tab by mouth Before breakfast and dinner.  glucose blood VI test strips (PHARMACIST CHOICE) strip One Touch Ultra Strips; 1Check glucose 1-2 times daily, Diagnosis E11.9    Lancets misc For use with One Touch Mini Glucometer, Use 1-2 times daily with glucose checks, Diagnosis E11.9    fluticasone (FLOVENT HFA) 220 mcg/actuation inhaler Take  by inhalation.     quinapril-hydrochlorothiazide (ACCURETIC) 20-12.5 mg per tablet TAKE 1 TABLET TWICE A DAY    omega-3 fatty acids-vitamin e 1,000 mg cap Take 1 Cap by mouth.  Aspirin, Buffered 81 mg tab Take  by mouth.  multivitamin (ONE A DAY) tablet Take 1 Tab by mouth daily.  latanoprost (XALATAN) 0.005 % ophthalmic solution Administer 1 Drop to both eyes nightly.  PROAIR HFA 90 mcg/actuation inhaler      No Known Allergies      ASSESSMENT and PLAN  BP stable, pt feels well on Metformin and will follow back up with Dr. Reginald Banegas as schedule, cont current care, eye appt not due until the end of this year. Care plan reviewed and pt understands. After visit summary printed and reviewed with patient. Bharath Rausch was seen today for hypertension and diabetes. Diagnoses and all orders for this visit:    Essential hypertension    Elevated glucose    Recurrent bronchospasm    Benign prostatic hyperplasia, presence of lower urinary tract symptoms unspecified, unspecified morphology    Other orders  -     Cancel: quinapril-hydroCHLOROthiazide (ACCURETIC) 20-12.5 mg per tablet; TAKE 1 TABLET TWICE A DAY      Follow-up Disposition:  Return in about 6 months (around 8/27/2017).   lab results and schedule of future lab studies reviewed with patient

## 2017-03-27 ENCOUNTER — TELEPHONE (OUTPATIENT)
Dept: ENDOCRINOLOGY | Age: 82
End: 2017-03-27

## 2017-03-27 NOTE — TELEPHONE ENCOUNTER
3/27/2017, 4:08 PM    Attempted to call Kacie Faye, reached voice mail. I left a message to return my call.       Mindi Camacho

## 2017-03-27 NOTE — TELEPHONE ENCOUNTER
Called to discuss patients blood sugars. Tried all three numbers, unable to reach patient. Blood sugars looking good. AM 90's average, now lows  Dinner 100-110 average, no lows    Can reduce his dose of glipizide with breakfast and dinner. If he is already on the lowest dose, he can trial off the glipizide and just continue with the metformin. Adelita Loomis.  39 [facility]  [address] [place] [address]

## 2017-03-28 ENCOUNTER — TELEPHONE (OUTPATIENT)
Dept: ENDOCRINOLOGY | Age: 82
End: 2017-03-28

## 2017-03-28 NOTE — TELEPHONE ENCOUNTER
Attempted to call patient,   No answer,    Myah Loyd.  39 [facility]  [address] [place] [address]

## 2017-03-28 NOTE — TELEPHONE ENCOUNTER
----- Message from Jad Lan sent at 3/28/2017  1:55 PM EDT -----  Regarding: Dr. Britton Machado  The patient is returning a call. Best contact number is [phone],  if no answer, please call [phone].

## 2017-03-29 NOTE — TELEPHONE ENCOUNTER
Malena Pereira called back and i relayed Dr. Thuy Armstrong message. He said he was off the Glipizide and was only taking the metformin. I told him to keep up the good work.   Jannet Khoury

## 2017-04-05 RX ORDER — METFORMIN HYDROCHLORIDE 500 MG/1
TABLET, EXTENDED RELEASE ORAL
Qty: 180 TAB | Refills: 4 | Status: SHIPPED | OUTPATIENT
Start: 2017-04-05

## 2017-05-05 ENCOUNTER — TELEPHONE (OUTPATIENT)
Dept: ENDOCRINOLOGY | Age: 82
End: 2017-05-05

## 2017-05-05 NOTE — TELEPHONE ENCOUNTER
Blood sugar log received and reviewed:    AM   Dinner 100-150  Bedtime 100-150    Only taking his metformin    No change in treatment,  Attempted to call patient to advise but no answer,  He will be here in 5 days for his next appt. After next visit can have patient RTC PRN.

## 2017-05-10 ENCOUNTER — OFFICE VISIT (OUTPATIENT)
Dept: ENDOCRINOLOGY | Age: 82
End: 2017-05-10

## 2017-05-10 VITALS
DIASTOLIC BLOOD PRESSURE: 77 MMHG | BODY MASS INDEX: 25.94 KG/M2 | HEART RATE: 76 BPM | SYSTOLIC BLOOD PRESSURE: 136 MMHG | HEIGHT: 66 IN | WEIGHT: 161.4 LBS

## 2017-05-10 DIAGNOSIS — E11.9 TYPE 2 DIABETES MELLITUS WITHOUT COMPLICATION, WITHOUT LONG-TERM CURRENT USE OF INSULIN (HCC): Primary | ICD-10-CM

## 2017-05-10 DIAGNOSIS — I10 ESSENTIAL HYPERTENSION WITH GOAL BLOOD PRESSURE LESS THAN 130/80: ICD-10-CM

## 2017-05-10 LAB — HBA1C MFR BLD HPLC: 5.4 %

## 2017-05-10 NOTE — PATIENT INSTRUCTIONS
Continue metformin twice daily  Continue diabetes management with your primary care team  Diet and exercise,  Can return in the future as needed,    Best wishes,  Shannon Gomez.  39 [facility]  [address] [place] [address]

## 2017-05-10 NOTE — MR AVS SNAPSHOT
Visit Information Date & Time Provider Department Dept. Phone Encounter #  
 5/10/2017 10:50 AM Raj Ornelas, [address] [place] [address] Diabetes and Endocrinology [phone] [id number] Follow-up Instructions Return if symptoms worsen or fail to improve. Upcoming Health Maintenance Date Due Pneumococcal 65+ Low/Medium Risk (2 of 2 - PPSV23) 7/13/2017 MEDICARE YEARLY EXAM 7/14/2017 INFLUENZA AGE 9 TO ADULT 8/1/2017 GLAUCOMA SCREENING Q2Y 4/14/2018 DTaP/Tdap/Td series (2 - Td) 7/13/2026 Allergies as of 5/10/2017  Review Complete On: 5/10/2017 By: Raj Ornelas MD  
 No Known Allergies Current Immunizations  Never Reviewed Name Date Pneumococcal Conjugate (PCV-13) 7/13/2016 Not reviewed this visit You Were Diagnosed With   
  
 Codes Comments Type 2 diabetes mellitus without complication, without long-term current use of insulin (HCC)    -  Primary ICD-10-CM: E11.9 ICD-9-CM: 250.00 Essential hypertension with goal blood pressure less than 130/80     ICD-10-CM: I10 
ICD-9-CM: 401.9 Vitals BP Pulse Height(growth percentile) Weight(growth percentile) BMI Smoking Status 136/77 (BP 1 Location: Right arm, BP Patient Position: Sitting) 76 5' 6\" (1.676 m) 161 lb 6.4 oz (73.2 kg) 26.05 kg/m2 Former Smoker Vitals History BMI and BSA Data Body Mass Index Body Surface Area 26.05 kg/m 2 1.85 m 2 Preferred Pharmacy Pharmacy Name Phone [id number] [address] [phone] Your Updated Medication List  
  
   
This list is accurate as of: 5/10/17 11:29 AM.  Always use your most recent med list.  
  
  
  
  
 aspirin, buffered 81 mg Tab Take  by mouth. fluticasone 220 mcg/actuation inhaler Commonly known as:  FLOVENT HFA Take  by inhalation. glucose blood VI test strips strip Commonly known as:  PHARMACIST CHOICE  
 One Touch Ultra Strips; 1Check glucose 1-2 times daily, Diagnosis E11.9 * Lancets Misc For use with One Touch Mini Glucometer, Use 1-2 times daily with glucose checks, Diagnosis E11.9 * One Touch Delica 33 gauge Misc Generic drug:  lancets USE 1-2 TIMES DAILY WITH GLUCOSE CHECKS, DIAGNOSIS E11.9  
  
 metFORMIN  mg tablet Commonly known as:  GLUCOPHAGE XR Take one tab daily before breakfast and dinner  
  
 multivitamin tablet Commonly known as:  ONE A DAY Take 1 Tab by mouth daily. omega-3 fatty acids-vitamin e 1,000 mg Cap Take 1 Cap by mouth. ONETOUCH ULTRAMINI monitoring kit Generic drug:  Blood-Glucose Meter * PROAIR HFA 90 mcg/actuation inhaler Generic drug:  albuterol * albuterol 2.5 mg /3 mL (0.083 %) nebulizer solution Commonly known as:  PROVENTIL VENTOLIN  
USE THE CONTENTS OF 1 VIAL VIA NEBULIZER EVERY 4 HOURS AS NEEDED FOR BREATHING PROBLEMS  
  
 quinapril-hydroCHLOROthiazide 20-12.5 mg per tablet Commonly known as:  ACCURETIC  
TAKE 1 TABLET TWICE A DAY  
  
 XALATAN 0.005 % ophthalmic solution Generic drug:  latanoprost  
Administer 1 Drop to both eyes nightly. * Notice: This list has 4 medication(s) that are the same as other medications prescribed for you. Read the directions carefully, and ask your doctor or other care provider to review them with you. We Performed the Following AMB POC HEMOGLOBIN A1C [68261 CPT(R)] Follow-up Instructions Return if symptoms worsen or fail to improve. Patient Instructions Continue metformin twice daily Continue diabetes management with your primary care team 
Diet and exercise, Can return in the future as needed, Best wishes, Norma Saydulce. [address] [facility] 8 [facility] Introducing [facility]!    
 Cortney Easley introduces [employer] patient portal. Now you can access parts of your medical record, email your doctor's office, and request medication refills online. 1. In your internet browser, go to [email or web address] 2. Click on the First Time User? Click Here link in the Sign In box. You will see the New Member Sign Up page. 3. Enter your [employer] Access Code exactly as it appears below. You will not need to use this code after youve completed the sign-up process. If you do not sign up before the expiration date, you must request a new code. · [employer] Access Code: [email or web address] Expires: 8/8/2017 11:21 AM 
 
4. Enter the last four digits of your Social Security Number (xxxx) and Date of Birth (mm/dd/yyyy) as indicated and click Submit. You will be taken to the next sign-up page. 5. Create a Loc[employer] ID. This will be your [employer] login ID and cannot be changed, so think of one that is secure and easy to remember. 6. Create a [employer] password. You can change your password at any time. 7. Enter your Password Reset Question and Answer. This can be used at a later time if you forget your password. 8. Enter your e-mail address. You will receive e-mail notification when new information is available in [address] 9. Click Sign Up. You can now view and download portions of your medical record. 10. Click the Download Summary menu link to download a portable copy of your medical information. If you have questions, please visit the Frequently Asked Questions section of the [employer] website. Remember, [employer] is NOT to be used for urgent needs. For medical emergencies, dial 911. Now available from your iPhone and Android! Please provide this summary of care documentation to your next provider. Your primary care clinician is listed as Vale Marie. If you have any questions after today's visit, please call [phone].

## 2017-05-10 NOTE — PROGRESS NOTES
CHIEF COMPLAINT: f/u evaluation for uncontrolled type 2 diabetes    HISTORY OF PRESENT ILLNESS:   Rojelio Bloch is a 80 y.o. male with a PMHx as noted below who presents to the endocrinology clinic for f/u evaluation of uncontrolled type 2 diabetes. Patient reports that he had been doing better. He sent me blood sugar log prev between visits and had been doing well. Only on metformin at this time.     Currently taking the following meds:  Metformin 500mg BID    Review of home blood glucose:  AM: 90's  Dinner: highest around 110    Review of most recent diabetes-related labs:  Lab Results   Component Value Date    HBA1C 7.7 (H) 02/08/2017    CHOL 167 02/08/2017    LDLC 96 02/08/2017    GFRAA 89 02/08/2017    GFRNA 77 02/08/2017    MCACR 30.9 (H) 02/08/2017    TSH 1.340 07/10/2015         PAST MEDICAL/SURGICAL HISTORY:   Past Medical History:   Diagnosis Date    Asthma     BPH (benign prostatic hyperplasia)     Diabetes ([facility] [address]     Hypertension     Other ill-defined conditions     Prostate problems     Past Surgical History:   Procedure Laterality Date    HX COLONOSCOPY      HX ENDOSCOPY      HX PROSTATECTOMY      IRRIGATE CATHETER  4/24/2010            ALLERGIES:   No Known Allergies    MEDICATIONS ON ADMISSION:     Current Outpatient Prescriptions:     metFORMIN ER (GLUCOPHAGE XR) 500 mg tablet, Take one tab daily before breakfast and dinner, Disp: 180 Tab, Rfl: 4    albuterol (PROVENTIL VENTOLIN) 2.5 mg /3 mL (0.083 %) nebulizer solution, USE THE CONTENTS OF 1 VIAL VIA NEBULIZER EVERY 4 HOURS AS NEEDED FOR BREATHING PROBLEMS, Disp: , Rfl: 1    ONE TOUCH DELICA 33 gauge misc, USE 1-2 TIMES DAILY WITH GLUCOSE CHECKS, DIAGNOSIS E11.9, Disp: , Rfl: 7    ONETOUCH ULTRAMINI monitoring kit, , Disp: , Rfl:     glucose blood VI test strips (PHARMACIST CHOICE) strip, One Touch Ultra Strips; 1Check glucose 1-2 times daily, Diagnosis E11.9, Disp: 100 Strip, Rfl: 3    Lancets misc, For use with One Touch Mini Glucometer, Use 1-2 times daily with glucose checks, Diagnosis E11.9, Disp: 1 Package, Rfl: 7    fluticasone (FLOVENT HFA) 220 mcg/actuation inhaler, Take  by inhalation. , Disp: , Rfl:     quinapril-hydrochlorothiazide (ACCURETIC) 20-12.5 mg per tablet, TAKE 1 TABLET TWICE A DAY, Disp: 180 Tab, Rfl: 3    omega-3 fatty acids-vitamin e 1,000 mg cap, Take 1 Cap by mouth., Disp: , Rfl:     Aspirin, Buffered 81 mg tab, Take  by mouth., Disp: , Rfl:     multivitamin (ONE A DAY) tablet, Take 1 Tab by mouth daily. , Disp: , Rfl:     latanoprost (XALATAN) 0.005 % ophthalmic solution, Administer 1 Drop to both eyes nightly., Disp: , Rfl:     PROAIR HFA 90 mcg/actuation inhaler, , Disp: , Rfl:     SOCIAL HISTORY:   Social History     Social History    Marital status:      Spouse name: N/A    Number of children: N/A    Years of education: N/A     Occupational History    Not on file. Social History Main Topics    Smoking status: Former Smoker     Quit date: 7/10/1955    Smokeless tobacco: Never Used    Alcohol use No    Drug use: No    Sexual activity: Yes     Partners: Female     Birth control/ protection: None     Other Topics Concern    Not on file     Social History Narrative       FAMILY HISTORY:  Family History   Problem Relation Age of Onset   24 [facility] Cancer Mother     Hypertension Mother     No Known Problems Father     Hypertension Sister     Arthritis-osteo Neg Hx        REVIEW OF SYSTEMS: Complete ROS assessed and noted for that which is described above, all else are negative.   Eyes: normal  ENT: normal  CVS: normal  Resp: normal  GI: normal  : normal  GYN: normal  Endocrine: normal  Integument: normal  Musculoskeletal: normal  Neuro: normal  Psych: normal      PHYSICAL EXAMINATION:    VITAL SIGNS:  Visit Vitals    /77 (BP 1 Location: Right arm, BP Patient Position: Sitting)    Pulse 76    Ht 5' 6\" (1.676 m)    Wt 161 lb 6.4 oz (73.2 kg)    BMI 26.05 kg/m2       GENERAL: NCAT, Sitting comfortably, NAD  EYES: EOMI, non-icteric, no proptosis  Ear/Nose/Throat: NCAT, no inflammation, no masses  LYMPH NODES: No LAD  CARDIOVASCULAR: S1 S2, RRR, No murmur, 2+ radial pulses  RESPIRATORY: CTA b/l, no wheeze/rales  GASTROINTESTINAL: soft, obese, NT, ND,  MUSCULOSKELETAL: Normal ROM, no atrophy  SKIN: warm, no edema/rash/ or other skin changes  NEUROLOGIC: 5/5 power all extremities, no tremors, AAOx3  PSYCHIATRIC: Normal affect, Normal insight and judgement         REVIEW OF LABORATORY AND RADIOLOGY DATA:   Labs and documentation have been reviewed as described above. ASSESSMENT AND PLAN:   Hodan Sheriff is a 80 y.o. male with a PMHx as noted above who presents to the endocrinology clinic for evaluation of uncontrolled type 2 diabetes. DM2 uncontrolled  HTN  HLD    Patient has been doing well on just metformin, will be able to continue diabetes management with his PCP at this time. POC A1c today is 5.4%, great job, diet and exercise. DM2:  Continue with metformin twice daily  Continue to check glucose in the AM  Diet and exercise  Provided with new log sheets    HTN: BP stable continue current meds, quinapril hctz  HLD: stable    Labs: POC A1c today is 5.4%    F/u PRN        Klever Bowers.  [address] [facility]

## 2017-05-18 ENCOUNTER — TELEPHONE (OUTPATIENT)
Dept: FAMILY MEDICINE CLINIC | Age: 82
End: 2017-05-18

## 2017-05-18 NOTE — TELEPHONE ENCOUNTER
----- Message from Nilton Dyson sent at 5/18/2017  9:59 AM EDT -----  Regarding: Juan/telephone  Pt has concerns about his healthcare once the doctor leaves the practice after June. Requesting a call back from Dr. Lazara small. Best contact number [phone].

## 2017-06-27 ENCOUNTER — OFFICE VISIT (OUTPATIENT)
Dept: FAMILY MEDICINE CLINIC | Age: 82
End: 2017-06-27

## 2017-06-27 VITALS
TEMPERATURE: 96.5 F | RESPIRATION RATE: 18 BRPM | DIASTOLIC BLOOD PRESSURE: 71 MMHG | HEART RATE: 67 BPM | SYSTOLIC BLOOD PRESSURE: 148 MMHG | OXYGEN SATURATION: 98 % | HEIGHT: 66 IN | BODY MASS INDEX: 25.49 KG/M2 | WEIGHT: 158.6 LBS

## 2017-06-27 DIAGNOSIS — E11.9 CONTROLLED TYPE 2 DIABETES MELLITUS WITHOUT COMPLICATION, WITHOUT LONG-TERM CURRENT USE OF INSULIN (HCC): ICD-10-CM

## 2017-06-27 DIAGNOSIS — H40.9 GLAUCOMA, UNSPECIFIED GLAUCOMA, UNSPECIFIED LATERALITY: ICD-10-CM

## 2017-06-27 DIAGNOSIS — I10 ESSENTIAL HYPERTENSION: Primary | ICD-10-CM

## 2017-06-27 DIAGNOSIS — J98.09 RECURRENT BRONCHOSPASM: ICD-10-CM

## 2017-06-27 RX ORDER — QUINAPRIL HCL AND HYDROCHLOROTHIAZIDE 20; 12.5 MG/1; MG/1
TABLET ORAL
Qty: 180 TAB | Refills: 1 | Status: SHIPPED | OUTPATIENT
Start: 2017-06-27

## 2017-06-27 NOTE — PROGRESS NOTES
HISTORY OF PRESENT ILLNESS  Rojelio Bloch is a 80 y.o. male here today for follow up of HTN. He was recently diagnosed w T2DM and is followed by Dr. Syed Torres, A1c in May was 5.4% and he is feeling well on Metformin. BP looks good and last labs were stable w stable renal function. Hypertension    The history is provided by the patient. This is a chronic problem. The current episode started more than 1 week ago. The problem has not changed since onset. Pertinent negatives include no chest pain, no dizziness and no shortness of breath. Review of Systems   Respiratory: Negative for shortness of breath. Cardiovascular: Negative for chest pain. Neurological: Negative for dizziness. Physical Exam   Constitutional: He is oriented to person, place, and time. He appears well-developed and well-nourished. /71 (BP 1 Location: Right arm, BP Patient Position: Sitting)  Pulse 67  Temp 96.5 °F (35.8 °C) (Oral)   Resp 18  Ht 5' 6\" (1.676 m)  Wt 158 lb 9.6 oz (71.9 kg)  SpO2 98%  BMI 25.6 kg/m2     HENT:   Head: Normocephalic and atraumatic. Cardiovascular: Normal heart sounds. Pulmonary/Chest: Breath sounds normal. He has no wheezes. Abdominal: Soft. There is no tenderness. Musculoskeletal: He exhibits no edema. Neurological: He is alert and oriented to person, place, and time. Nursing note and vitals reviewed.     Patient Active Problem List   Diagnosis Code    Essential hypertension I10    Recurrent bronchospasm J98.09    BPH (benign prostatic hyperplasia) N40.0    Glaucoma H40.9    History of colonoscopy Z98.890    History of cardiac catheterization Z98.890    Controlled type 2 diabetes mellitus without complication, without long-term current use of insulin ([address] E11.9     Past Medical History:   Diagnosis Date    Asthma     BPH (benign prostatic hyperplasia)     Diabetes ([address]     Hypertension     Other ill-defined conditions     Prostate problems     Social History Social History    Marital status:      Spouse name: N/A    Number of children: N/A    Years of education: N/A     Social History Main Topics    Smoking status: Former Smoker     Quit date: 7/10/1955    Smokeless tobacco: Never Used    Alcohol use No    Drug use: No    Sexual activity: Yes     Partners: Female     Birth control/ protection: None     Other Topics Concern    None     Social History Narrative     Family History   Problem Relation Age of Onset    Cancer Mother     Hypertension Mother     No Known Problems Father     Hypertension Sister     Arthritis-osteo Neg Hx      Current Outpatient Prescriptions   Medication Sig    quinapril-hydroCHLOROthiazide (ACCURETIC) 20-12.5 mg per tablet TAKE 1 TABLET TWICE A DAY    metFORMIN ER (GLUCOPHAGE XR) 500 mg tablet Take one tab daily before breakfast and dinner    albuterol (PROVENTIL VENTOLIN) 2.5 mg /3 mL (0.083 %) nebulizer solution USE THE CONTENTS OF 1 VIAL VIA NEBULIZER EVERY 4 HOURS AS NEEDED FOR BREATHING PROBLEMS    ONE TOUCH DELICA 33 gauge misc USE 1-2 TIMES DAILY WITH GLUCOSE CHECKS, DIAGNOSIS E11.9    ONETOUCH ULTRAMINI monitoring kit     glucose blood VI test strips (PHARMACIST CHOICE) strip One Touch Ultra Strips; 1Check glucose 1-2 times daily, Diagnosis E11.9    Lancets misc For use with One Touch Mini Glucometer, Use 1-2 times daily with glucose checks, Diagnosis E11.9    fluticasone (FLOVENT HFA) 220 mcg/actuation inhaler Take  by inhalation.  omega-3 fatty acids-vitamin e 1,000 mg cap Take 1 Cap by mouth.  Aspirin, Buffered 81 mg tab Take  by mouth.  multivitamin (ONE A DAY) tablet Take 1 Tab by mouth daily.  latanoprost (XALATAN) 0.005 % ophthalmic solution Administer 1 Drop to both eyes nightly.  PROAIR HFA 90 mcg/actuation inhaler      No Known Allergies    ASSESSMENT and PLAN  BP stable, cont current care. Care plan reviewed and pt understands.   After visit summary printed and reviewed with patient. Riana Valderrama was seen today for hypertension.     Diagnoses and all orders for this visit:    Essential hypertension  -     quinapril-hydroCHLOROthiazide (ACCURETIC) 20-12.5 mg per tablet; TAKE 1 TABLET TWICE A DAY    Controlled type 2 diabetes mellitus without complication, without long-term current use of insulin ([facility])    Recurrent bronchospasm    Glaucoma, unspecified glaucoma, unspecified laterality

## 2017-06-27 NOTE — MR AVS SNAPSHOT
Visit Information Date & Time Provider Department Dept. Phone Encounter #  
 6/27/2017 11:15 AM Destin Rosales MD [address] [facility] [phone] [id number] Upcoming Health Maintenance Date Due Pneumococcal 65+ Low/Medium Risk (2 of 2 - PPSV23) 7/13/2017 MEDICARE YEARLY EXAM 7/14/2017 INFLUENZA AGE 9 TO ADULT 8/1/2017 GLAUCOMA SCREENING Q2Y 4/14/2018 DTaP/Tdap/Td series (2 - Td) 7/13/2026 Allergies as of 6/27/2017  Review Complete On: 6/27/2017 By: Jaimee Olivo LPN No Known Allergies Current Immunizations  Never Reviewed Name Date Pneumococcal Conjugate (PCV-13) 7/13/2016 Not reviewed this visit You Were Diagnosed With   
  
 Codes Comments Essential hypertension    -  Primary ICD-10-CM: I10 
ICD-9-CM: 401.9 Vitals BP Pulse Temp Resp Height(growth percentile) Weight(growth percentile) 148/71 (BP 1 Location: Right arm, BP Patient Position: Sitting) 67 96.5 °F (35.8 °C) (Oral) 18 5' 6\" (1.676 m) 158 lb 9.6 oz (71.9 kg) SpO2 BMI Smoking Status 98% 25.6 kg/m2 Former Smoker Vitals History BMI and BSA Data Body Mass Index Body Surface Area  
 25.6 kg/m 2 1.83 m 2 Preferred Pharmacy Pharmacy Name Phone [address] [facility] [phone] Your Updated Medication List  
  
   
This list is accurate as of: 6/27/17 12:19 PM.  Always use your most recent med list.  
  
  
  
  
 aspirin, buffered 81 mg Tab Take  by mouth. fluticasone 220 mcg/actuation inhaler Commonly known as:  FLOVENT HFA Take  by inhalation. glucose blood VI test strips strip Commonly known as:  PHARMACIST CHOICE One Touch Ultra Strips; 1Check glucose 1-2 times daily, Diagnosis E11.9 * Lancets Misc For use with One Touch Mini Glucometer, Use 1-2 times daily with glucose checks, Diagnosis E11.9 * One Touch Delica 33 gauge Misc Generic drug:  lancets USE 1-2 TIMES DAILY WITH GLUCOSE CHECKS, DIAGNOSIS E11.9  
  
 metFORMIN  mg tablet Commonly known as:  GLUCOPHAGE XR Take one tab daily before breakfast and dinner  
  
 multivitamin tablet Commonly known as:  ONE A DAY Take 1 Tab by mouth daily. omega-3 fatty acids-vitamin e 1,000 mg Cap Take 1 Cap by mouth. ONETOUCH ULTRAMINI monitoring kit Generic drug:  Blood-Glucose Meter * PROAIR HFA 90 mcg/actuation inhaler Generic drug:  albuterol * albuterol 2.5 mg /3 mL (0.083 %) nebulizer solution Commonly known as:  PROVENTIL VENTOLIN  
USE THE CONTENTS OF 1 VIAL VIA NEBULIZER EVERY 4 HOURS AS NEEDED FOR BREATHING PROBLEMS  
  
 quinapril-hydroCHLOROthiazide 20-12.5 mg per tablet Commonly known as:  ACCURETIC  
TAKE 1 TABLET TWICE A DAY  
  
 XALATAN 0.005 % ophthalmic solution Generic drug:  latanoprost  
Administer 1 Drop to both eyes nightly. * Notice: This list has 4 medication(s) that are the same as other medications prescribed for you. Read the directions carefully, and ask your doctor or other care provider to review them with you. Prescriptions Sent to Pharmacy Refills  
 quinapril-hydroCHLOROthiazide (ACCURETIC) 20-12.5 mg per tablet 1 Sig: TAKE 1 TABLET TWICE A DAY Class: Normal  
 Pharmacy: [address], [address] [place] [address] #: [phone] [facility] SERVICES! [employer] introduces [employer] patient portal. Now you can access parts of your medical record, email your doctor's office, and request medication refills online. 1. In your internet browser, go to [email or web address] 2. Click on the First Time User? Click Here link in the Sign In box. You will see the New Member Sign Up page. 3. Enter your Listiki Access Code exactly as it appears below.  You will not need to use this code after youve completed the sign-up process. If you do not sign up before the expiration date, you must request a new code. · [employer] Access Code: [email or web address] Expires: 8/8/2017 11:21 AM 
 
4. Enter the last four digits of your Social Security Number (xxxx) and Date of Birth (mm/dd/yyyy) as indicated and click Submit. You will be taken to the next sign-up page. 5. Create a Inter[employer]on ID. This will be your [employer] login ID and cannot be changed, so think of one that is secure and easy to remember. 6. Create a [employer] password. You can change your password at any time. 7. Enter your Password Reset Question and Answer. This can be used at a later time if you forget your password. 8. Enter your e-mail address. You will receive e-mail notification when new information is available in [address] 9. Click Sign Up. You can now view and download portions of your medical record. 10. Click the Download Summary menu link to download a portable copy of your medical information. If you have questions, please visit the Frequently Asked Questions section of the [employer] website. Remember, [employer] is NOT to be used for urgent needs. For medical emergencies, dial 911. Now available from your iPhone and Android! Please provide this summary of care documentation to your next provider. Your primary care clinician is listed as Giancarlo Black. If you have any questions after today's visit, please call [phone].

## 2017-07-10 ENCOUNTER — TELEPHONE (OUTPATIENT)
Dept: ENDOCRINOLOGY | Age: 82
End: 2017-07-10

## 2017-07-10 NOTE — TELEPHONE ENCOUNTER
Mr Carmel Neil wanted to know if the Metformin could cause constipation as he has been constipated since starting taking it. I let him know that constipation was not a side effect and that he was probably dehydrated and to drink more. He is going to try that.   Elizabeth Castillo

## 2017-07-10 NOTE — TELEPHONE ENCOUNTER
Patient called to say he is having \"bowel issues\" with the Metformin. He would like a call back please. He can be reached at:   [phone].

## 2020-10-23 ENCOUNTER — HOSPITAL ENCOUNTER (OUTPATIENT)
Dept: GENERAL RADIOLOGY | Age: 85
Discharge: HOME | End: 2020-10-23
Attending: INTERNAL MEDICINE
Payer: MEDICARE

## 2020-10-23 ENCOUNTER — TRANSCRIBE ORDER (OUTPATIENT)
Dept: GENERAL RADIOLOGY | Age: 85
End: 2020-10-23

## 2020-10-23 DIAGNOSIS — R10.9 ACUTE ABDOMINAL PAIN: Primary | ICD-10-CM

## 2020-10-23 DIAGNOSIS — R10.9 ACUTE ABDOMINAL PAIN: ICD-10-CM

## 2020-10-23 PROCEDURE — 74019 RADEX ABDOMEN 2 VIEWS: CPT

## 2020-10-26 ENCOUNTER — HOSPITAL ENCOUNTER (OUTPATIENT)
Dept: GENERAL RADIOLOGY | Age: 85
Discharge: HOME | End: 2020-10-26
Attending: INTERNAL MEDICINE
Payer: MEDICARE

## 2020-10-26 ENCOUNTER — TRANSCRIBE ORDER (OUTPATIENT)
Dept: EMERGENCY DEPT | Age: 85
End: 2020-10-26

## 2020-10-26 DIAGNOSIS — Z12.31 OTHER SCREENING MAMMOGRAM: ICD-10-CM

## 2020-10-26 DIAGNOSIS — Z12.31 OTHER SCREENING MAMMOGRAM: Primary | ICD-10-CM

## 2020-10-26 PROCEDURE — 74019 RADEX ABDOMEN 2 VIEWS: CPT

## 2022-03-19 PROBLEM — E11.9 CONTROLLED TYPE 2 DIABETES MELLITUS WITHOUT COMPLICATION, WITHOUT LONG-TERM CURRENT USE OF INSULIN (HCC): Status: ACTIVE | Noted: 2017-06-27

## 2023-02-28 ENCOUNTER — ANESTHESIA EVENT (OUTPATIENT)
Dept: ENDOSCOPY | Age: 88
End: 2023-02-28
Payer: MEDICARE

## 2023-02-28 ENCOUNTER — HOSPITAL ENCOUNTER (OUTPATIENT)
Age: 88
Setting detail: OUTPATIENT SURGERY
Discharge: HOME | End: 2023-02-28
Attending: INTERNAL MEDICINE | Admitting: INTERNAL MEDICINE
Payer: MEDICARE

## 2023-02-28 ENCOUNTER — ANESTHESIA (OUTPATIENT)
Dept: ENDOSCOPY | Age: 88
End: 2023-02-28
Payer: MEDICARE

## 2023-02-28 VITALS
TEMPERATURE: 98 F | HEART RATE: 70 BPM | HEIGHT: 66 IN | OXYGEN SATURATION: 96 % | DIASTOLIC BLOOD PRESSURE: 75 MMHG | RESPIRATION RATE: 21 BRPM | BODY MASS INDEX: 26.68 KG/M2 | WEIGHT: 166 LBS | SYSTOLIC BLOOD PRESSURE: 134 MMHG

## 2023-02-28 PROCEDURE — 74011250636 HC RX REV CODE- 250/636: Performed by: NURSE ANESTHETIST, CERTIFIED REGISTERED

## 2023-02-28 PROCEDURE — 2709999900 HC NON-CHARGEABLE SUPPLY: Performed by: INTERNAL MEDICINE

## 2023-02-28 PROCEDURE — 77030020268 HC MISC GENERAL SUPPLY: Performed by: INTERNAL MEDICINE

## 2023-02-28 PROCEDURE — 76040000019: Performed by: INTERNAL MEDICINE

## 2023-02-28 PROCEDURE — 74011000250 HC RX REV CODE- 250: Performed by: NURSE ANESTHETIST, CERTIFIED REGISTERED

## 2023-02-28 PROCEDURE — 76060000031 HC ANESTHESIA FIRST 0.5 HR: Performed by: INTERNAL MEDICINE

## 2023-02-28 RX ORDER — EPINEPHRINE 0.1 MG/ML
1 INJECTION INTRACARDIAC; INTRAVENOUS
Status: DISCONTINUED | OUTPATIENT
Start: 2023-02-28 | End: 2023-02-28 | Stop reason: HOSPADM

## 2023-02-28 RX ORDER — PROPOFOL 10 MG/ML
INJECTION, EMULSION INTRAVENOUS AS NEEDED
Status: DISCONTINUED | OUTPATIENT
Start: 2023-02-28 | End: 2023-02-28 | Stop reason: HOSPADM

## 2023-02-28 RX ORDER — MIDAZOLAM HYDROCHLORIDE 1 MG/ML
.25-5 INJECTION, SOLUTION INTRAMUSCULAR; INTRAVENOUS
Status: DISCONTINUED | OUTPATIENT
Start: 2023-02-28 | End: 2023-02-28 | Stop reason: HOSPADM

## 2023-02-28 RX ORDER — SODIUM CHLORIDE 9 MG/ML
INJECTION, SOLUTION INTRAVENOUS
Status: DISCONTINUED | OUTPATIENT
Start: 2023-02-28 | End: 2023-02-28 | Stop reason: HOSPADM

## 2023-02-28 RX ORDER — ASCORBIC ACID 500 MG
TABLET ORAL
COMMUNITY

## 2023-02-28 RX ORDER — CARVEDILOL 6.25 MG/1
TABLET ORAL
COMMUNITY

## 2023-02-28 RX ORDER — SODIUM CHLORIDE 0.9 % (FLUSH) 0.9 %
5-40 SYRINGE (ML) INJECTION AS NEEDED
Status: DISCONTINUED | OUTPATIENT
Start: 2023-02-28 | End: 2023-02-28 | Stop reason: HOSPADM

## 2023-02-28 RX ORDER — ATROPINE SULFATE 0.1 MG/ML
0.5 INJECTION INTRAVENOUS
Status: DISCONTINUED | OUTPATIENT
Start: 2023-02-28 | End: 2023-02-28 | Stop reason: HOSPADM

## 2023-02-28 RX ORDER — DEXTROMETHORPHAN/PSEUDOEPHED 2.5-7.5/.8
1.2 DROPS ORAL
Status: DISCONTINUED | OUTPATIENT
Start: 2023-02-28 | End: 2023-02-28 | Stop reason: HOSPADM

## 2023-02-28 RX ORDER — PREDNISONE 5 MG/1
5 TABLET ORAL DAILY
COMMUNITY
Start: 2023-02-20

## 2023-02-28 RX ORDER — SODIUM CHLORIDE 9 MG/ML
150 INJECTION, SOLUTION INTRAVENOUS CONTINUOUS
Status: DISCONTINUED | OUTPATIENT
Start: 2023-02-28 | End: 2023-02-28 | Stop reason: HOSPADM

## 2023-02-28 RX ORDER — AMLODIPINE BESYLATE 5 MG/1
TABLET ORAL
COMMUNITY

## 2023-02-28 RX ORDER — VALSARTAN 320 MG/1
TABLET ORAL
COMMUNITY

## 2023-02-28 RX ORDER — LIDOCAINE HYDROCHLORIDE 20 MG/ML
INJECTION, SOLUTION EPIDURAL; INFILTRATION; INTRACAUDAL; PERINEURAL AS NEEDED
Status: DISCONTINUED | OUTPATIENT
Start: 2023-02-28 | End: 2023-02-28 | Stop reason: HOSPADM

## 2023-02-28 RX ORDER — SODIUM CHLORIDE 0.9 % (FLUSH) 0.9 %
5-40 SYRINGE (ML) INJECTION EVERY 8 HOURS
Status: DISCONTINUED | OUTPATIENT
Start: 2023-02-28 | End: 2023-02-28 | Stop reason: HOSPADM

## 2023-02-28 RX ORDER — FENTANYL CITRATE 50 UG/ML
200 INJECTION, SOLUTION INTRAMUSCULAR; INTRAVENOUS
Status: DISCONTINUED | OUTPATIENT
Start: 2023-02-28 | End: 2023-02-28 | Stop reason: HOSPADM

## 2023-02-28 RX ADMIN — PROPOFOL 30 MG: 10 INJECTION, EMULSION INTRAVENOUS at 10:26

## 2023-02-28 RX ADMIN — PROPOFOL 70 MG: 10 INJECTION, EMULSION INTRAVENOUS at 10:17

## 2023-02-28 RX ADMIN — SODIUM CHLORIDE: 900 INJECTION, SOLUTION INTRAVENOUS at 09:49

## 2023-02-28 RX ADMIN — LIDOCAINE HYDROCHLORIDE 100 MG: 20 INJECTION, SOLUTION EPIDURAL; INFILTRATION; INTRACAUDAL; PERINEURAL at 10:17

## 2023-02-28 RX ADMIN — PROPOFOL 30 MG: 10 INJECTION, EMULSION INTRAVENOUS at 10:23

## 2023-02-28 RX ADMIN — PROPOFOL 30 MG: 10 INJECTION, EMULSION INTRAVENOUS at 10:20

## 2023-02-28 RX ADMIN — PROPOFOL 30 MG: 10 INJECTION, EMULSION INTRAVENOUS at 10:19

## 2023-02-28 NOTE — PROGRESS NOTES

## 2023-02-28 NOTE — DISCHARGE INSTRUCTIONS
Kavitha Pham [facility] [address] Rutledge M.D.  Ailyn Stiles, [address]  [phone]         EGD DISCHARGE INSTRUCTIONS      Nawaf Mata  [id number]  12/10/1934    DISCOMFORT:  Sore throat- throat lozenges or warm salt water gargle  Redness at IV site- apply warm compress to area; if redness or soreness persist- contact your physician  Gaseous discomfort- walking, belching will help relieve any discomfort  You may not operate a vehicle for 12 hours  You may not engage in an occupation involving machinery or appliances for the  rest of today  You may not drink alcoholic beverages for at least 12 hours  Avoid making any critical decisions for at least 24 hours    DIET:   You may resume your normal diet, but some patients find that heavy or large  meals may lead to indigestion or vomiting. I suggest a light meal as first food  Intake. I recommend a whole food, plant-based diet for your overall health. ACTIVITY:  You may resume your normal daily activities. It is recommended that you spend the remainder of the day resting - avoid any strenuous activity. CALL RAY Stanton ANY SIGN OF:   Increasing pain, nausea, vomiting  Abdominal distension (swelling)  Significant bleeding (oral or rectal)  Fever   Pain in chest area  Shortness of breath    Additional Instructions:   Call Dr. Leora Rutledge if any questions or problems at [phone]   EGD showed normal exam s/p dilation. Soft mechanical diet today, advance diet tomorrow. Resume aspirin tomorrow, and Eliquis in 2 days. Learning About Coronavirus [phone])  Coronavirus [phone]): Overview  What is coronavirus (COVID-19)? The coronavirus disease (COVID-19) is caused by a virus. It is an illness that was first found in [place], [place], in December 2019. It has since spread worldwide. The virus can cause fever, cough, and trouble breathing. In severe cases, it can cause pneumonia and make it hard to breathe without help.  It can cause death.  Coronaviruses are a large group of viruses. They cause the common cold. They also cause more serious illnesses like Middle East respiratory syndrome (MERS) and severe acute respiratory syndrome (SARS). COVID-19 is caused by a novel coronavirus. That means it's a new type that has not been seen in people before. This virus spreads person-to-person through droplets from coughing and sneezing. It can also spread when you are close to someone who is infected. And it can spread when you touch something that has the virus on it, such as a doorknob or a tabletop. What can you do to protect yourself from coronavirus (COVID-19)? The best way to protect yourself from getting sick is to: Avoid areas where there is an outbreak. Avoid contact with people who may be infected. Wash your hands often with soap or alcohol-based hand sanitizers. Avoid crowds and try to stay at least 6 feet away from other people. Wash your hands often, especially after you cough or sneeze. Use soap and water, and scrub for at least 20 seconds. If soap and water aren't available, use an alcohol-based hand . Avoid touching your mouth, nose, and eyes. What can you do to avoid spreading the virus to others? To help avoid spreading the virus to others:  Cover your mouth with a tissue when you cough or sneeze. Then throw the tissue in the trash. Use a disinfectant to clean things that you touch often. Stay home if you are sick or have been exposed to the virus. Don't go to school, work, or public areas. And don't use public transportation. If you are sick:  Leave your home only if you need to get medical care. But call the doctor's office first so they know you're coming. And wear a face mask, if you have one. If you have a face mask, wear it whenever you're around other people. It can help stop the spread of the virus when you cough or sneeze. Clean and disinfect your home every day.  Use household  and disinfectant wipes or sprays. Take special care to clean things that you grab with your hands. These include doorknobs, remote controls, phones, and handles on your refrigerator and microwave. And don't forget countertops, tabletops, bathrooms, and computer keyboards. When to call for help  Call 911 anytime you think you may need emergency care. For example, call if:  You have severe trouble breathing. (You can't talk at all.)  You have constant chest pain or pressure. You are severely dizzy or lightheaded. You are confused or can't think clearly. Your face and lips have a blue color. You pass out (lose consciousness) or are very hard to wake up. Call your doctor now if you develop symptoms such as:  Shortness of breath. Fever. Cough. If you need to get care, call ahead to the doctor's office for instructions before you go. Make sure you wear a face mask, if you have one, to prevent exposing other people to the virus. Where can you get the latest information? The following health organizations are tracking and studying this virus. Their websites contain the most up-to-date information. Priyank Goetz also learn what to do if you think you may have been exposed to the virus. U.S. Centers for Disease Control and Prevention (CDC): The CDC provides updated news about the disease and travel advice. The website also tells you how to prevent the spread of infection. www.cdc.gov  World Health Organization [facility]): WHO offers information about the virus outbreaks. WHO also has travel advice. www.who.int  Current as of: April 1, 2020               Content Version: 12.4  © 2006-2020 Healthwise, Incorporated. Care instructions adapted under license by your healthcare professional. If you have questions about a medical condition or this instruction, always ask your healthcare professional. [address] any warranty or liability for your use of this information.

## 2023-02-28 NOTE — ANESTHESIA PREPROCEDURE EVALUATION
Relevant Problems   No relevant active problems       Anesthetic History   No history of anesthetic complications            Review of Systems / Medical History  Patient summary reviewed, nursing notes reviewed and pertinent labs reviewed    Pulmonary  Within defined limits          Asthma        Neuro/Psych   Within defined limits           Cardiovascular  Within defined limits  Hypertension                   GI/Hepatic/Renal  Within defined limits              Endo/Other  Within defined limits  Diabetes         Other Findings              Physical Exam    Airway  Mallampati: II  TM Distance: > 6 cm  Neck ROM: normal range of motion   Mouth opening: Normal     Cardiovascular  Regular rate and rhythm,  S1 and S2 normal,  no murmur, click, rub, or gallop             Dental  No notable dental hx       Pulmonary  Breath sounds clear to auscultation               Abdominal  GI exam deferred       Other Findings            Anesthetic Plan    ASA: 2  Anesthesia type: MAC            Anesthetic plan and risks discussed with: Patient

## 2023-02-28 NOTE — ANESTHESIA POSTPROCEDURE EVALUATION
Procedure(s):  ESOPHAGOGASTRODUODENOSCOPY (EGD) WITH DILATION  ESOPHAGEAL DILATION. MAC    Anesthesia Post Evaluation        Patient location during evaluation: PACU  Patient participation: complete - patient participated  Level of consciousness: awake and alert  Pain management: adequate  Airway patency: patent  Anesthetic complications: no  Cardiovascular status: acceptable  Respiratory status: acceptable  Hydration status: acceptable  Comments: I have seen and evaluated the patient and is ready for discharge. Laural Conception, MD    Post anesthesia nausea and vomiting:  none      INITIAL Post-op Vital signs:   Vitals Value Taken Time   /75 02/28/23 1100   Temp 36.7 °C (98 °F) 02/28/23 1033   Pulse 71 02/28/23 1100   Resp 21 02/28/23 1100   SpO2 96 % 02/28/23 1100   Vitals shown include unvalidated device data.

## 2023-02-28 NOTE — H&P
[address] [place] [address]          Pre-procedure History and Physical       NAME:  Felipe Hanna   :   12/10/1934   MRN:   [id number]     CHIEF COMPLAINT/HPI: dysphagia    PMH:  Past Medical History:   Diagnosis Date    Asthma     BPH (benign prostatic hyperplasia)     Diabetes ([address]     prediabetes    Hypertension     Other ill-defined conditions(799.89)     Prostate problems    TIA (transient ischemic attack)        PSH:  Past Surgical History:   Procedure Laterality Date    HX COLONOSCOPY      HX ENDOSCOPY      HX HEMORRHOIDECTOMY      HX PROSTATECTOMY      IRRIGATE CATHETER  2010    no longer       Allergies:  No Known Allergies    Home Medications:  Prior to Admission Medications   Prescriptions Last Dose Informant Patient Reported? Taking? Aspirin, Buffered 81 mg tab 2023  Yes Yes   Sig: Take  by mouth. amLODIPine (NORVASC) 5 mg tablet   Yes No   Sig: amlodipine 5 mg tablet   apixaban (ELIQUIS) 5 mg tablet 2023  Yes Yes   Sig: Eliquis 5 mg tablet   ascorbic acid, vitamin C, (VITAMIN C) 500 mg tablet 2023  Yes Yes   Sig: Vitamin C 500 mg tablet   carvediloL (COREG) 6.25 mg tablet 2023  Yes Yes   Sig: carvedilol 6.25 mg tablet   TAKE 1 TABLET BY MOUTH TWICE A DAY   fluticasone (FLOVENT HFA) 220 mcg/actuation inhaler 2023  Yes Yes   Sig: Take  by inhalation. latanoprost (XALATAN) 0.005 % ophthalmic solution   Yes No   Sig: Administer 1 Drop to both eyes nightly. multivitamin (ONE A DAY) tablet 2023  Yes Yes   Sig: Take 1 Tab by mouth daily. omega-3 fatty acids-vitamin e 1,000 mg cap 2023  Yes Yes   Sig: Take 1 Cap by mouth.   predniSONE (DELTASONE) 5 mg tablet 2023  Yes Yes   Sig: Take 5 mg by mouth daily.    valsartan (DIOVAN) 320 mg tablet 2023  Yes Yes   Sig: valsartan 320 mg tablet   TAKE 1 TABLET BY MOUTH EVERY DAY      Facility-Administered Medications: None       [facility] Medications:  Current Facility-Administered Medications   Medication Dose Route Frequency    0.9% sodium chloride infusion  150 mL/hr IntraVENous CONTINUOUS    sodium chloride (NS) flush 5-40 mL  5-40 mL IntraVENous Q8H    sodium chloride (NS) flush 5-40 mL  5-40 mL IntraVENous PRN    midazolam (VERSED) injection 0.25-5 mg  0.25-5 mg IntraVENous Multiple    fentaNYL citrate (PF) injection 200 mcg  200 mcg IntraVENous Multiple    simethicone (MYLICON) 56YB/1.9GZ oral drops 80 mg  1.2 mL Oral Multiple    atropine injection 0.5 mg  0.5 mg IntraVENous ONCE PRN    EPINEPHrine (ADRENALIN) 0.1 mg/mL syringe 1 mg  1 mg Endoscopically ONCE PRN     Facility-Administered Medications Ordered in Other Encounters   Medication Dose Route Frequency    0.9% sodium chloride infusion   IntraVENous CONTINUOUS       Family History:  Family History   Problem Relation Age of Onset    Cancer Mother     Hypertension Mother     No Known Problems Father     Hypertension Sister     OSTEOARTHRITIS Neg Hx        Social History:  Social History     Tobacco Use    Smoking status: Former     Types: Cigarettes     Quit date: 7/10/1955     Years since quittin.6    Smokeless tobacco: Never   Substance Use Topics    Alcohol use: No       The patient was counseled at length about the risks of damion Covid-19 in the makayla-operative and post-operative states including the recovery window of their procedure. The patient was made aware that damion Covid-19 after a surgical procedure may worsen their prognosis for recovering from the virus and lend to a higher morbidity and or mortality risk. The patient was given the options of postponing their procedure. All of the risks, benefits, and alternatives were discussed. The patient does  wish to proceed with the procedure.     PHYSICAL EXAM PRIOR TO SEDATION:  General: Alert, in no acute distress    Lungs:            CTA bilaterally  Heart:  Normal S1, S2    Abdomen: Soft, Non distended, Non tender. Normoactive bowel sounds. Assessment:   Stable for sedation administration.     Plan:     Endoscopic procedure with sedation     Signed By: Omkar Wallace MD     2/28/2023  10:11 AM

## 2024-07-29 ENCOUNTER — HOSPITAL ENCOUNTER (OUTPATIENT)
Age: 89
Discharge: HOME | End: 2024-08-01
Payer: COMMERCIAL

## 2024-07-29 DIAGNOSIS — R05.1 ACUTE COUGH: ICD-10-CM

## 2024-07-29 PROCEDURE — 71046 X-RAY EXAM CHEST 2 VIEWS: CPT

## (undated) DEVICE — TUBING HYDR IRR --

## (undated) DEVICE — SAFEGUIDE GUIDEWIRE

## (undated) DEVICE — Device